# Patient Record
Sex: FEMALE | Race: ASIAN | NOT HISPANIC OR LATINO | URBAN - METROPOLITAN AREA
[De-identification: names, ages, dates, MRNs, and addresses within clinical notes are randomized per-mention and may not be internally consistent; named-entity substitution may affect disease eponyms.]

---

## 2021-01-25 ENCOUNTER — INPATIENT (INPATIENT)
Facility: HOSPITAL | Age: 51
LOS: 1 days | Discharge: HOME | DRG: 310 | End: 2021-01-27
Attending: INTERNAL MEDICINE | Admitting: INTERNAL MEDICINE
Payer: COMMERCIAL

## 2021-01-25 VITALS
SYSTOLIC BLOOD PRESSURE: 159 MMHG | TEMPERATURE: 98 F | HEART RATE: 155 BPM | DIASTOLIC BLOOD PRESSURE: 95 MMHG | OXYGEN SATURATION: 98 % | RESPIRATION RATE: 20 BRPM

## 2021-01-25 DIAGNOSIS — Z29.9 ENCOUNTER FOR PROPHYLACTIC MEASURES, UNSPECIFIED: ICD-10-CM

## 2021-01-25 DIAGNOSIS — I10 ESSENTIAL (PRIMARY) HYPERTENSION: ICD-10-CM

## 2021-01-25 DIAGNOSIS — R00.0 TACHYCARDIA, UNSPECIFIED: ICD-10-CM

## 2021-01-25 LAB
ALBUMIN SERPL ELPH-MCNC: 4.4 G/DL — SIGNIFICANT CHANGE UP (ref 3.5–5)
ALP SERPL-CCNC: 70 U/L — SIGNIFICANT CHANGE UP (ref 40–120)
ALT FLD-CCNC: 38 U/L DA — SIGNIFICANT CHANGE UP (ref 10–60)
ANION GAP SERPL CALC-SCNC: 11 MMOL/L — SIGNIFICANT CHANGE UP (ref 5–17)
APTT BLD: 33.5 SEC — SIGNIFICANT CHANGE UP (ref 27.5–35.5)
AST SERPL-CCNC: 21 U/L — SIGNIFICANT CHANGE UP (ref 10–40)
BASOPHILS # BLD AUTO: 0.06 K/UL — SIGNIFICANT CHANGE UP (ref 0–0.2)
BASOPHILS NFR BLD AUTO: 0.3 % — SIGNIFICANT CHANGE UP (ref 0–2)
BILIRUB SERPL-MCNC: 0.9 MG/DL — SIGNIFICANT CHANGE UP (ref 0.2–1.2)
BUN SERPL-MCNC: 12 MG/DL — SIGNIFICANT CHANGE UP (ref 7–18)
CALCIUM SERPL-MCNC: 9 MG/DL — SIGNIFICANT CHANGE UP (ref 8.4–10.5)
CHLORIDE SERPL-SCNC: 106 MMOL/L — SIGNIFICANT CHANGE UP (ref 96–108)
CO2 SERPL-SCNC: 21 MMOL/L — LOW (ref 22–31)
CREAT SERPL-MCNC: 0.8 MG/DL — SIGNIFICANT CHANGE UP (ref 0.5–1.3)
EOSINOPHIL # BLD AUTO: 0.11 K/UL — SIGNIFICANT CHANGE UP (ref 0–0.5)
EOSINOPHIL NFR BLD AUTO: 0.6 % — SIGNIFICANT CHANGE UP (ref 0–6)
GLUCOSE SERPL-MCNC: 121 MG/DL — HIGH (ref 70–99)
HCG SERPL-ACNC: <1 MIU/ML — SIGNIFICANT CHANGE UP
HCG UR QL: NEGATIVE — SIGNIFICANT CHANGE UP
HCT VFR BLD CALC: 41.6 % — SIGNIFICANT CHANGE UP (ref 34.5–45)
HGB BLD-MCNC: 13.9 G/DL — SIGNIFICANT CHANGE UP (ref 11.5–15.5)
IMM GRANULOCYTES NFR BLD AUTO: 0.5 % — SIGNIFICANT CHANGE UP (ref 0–1.5)
INR BLD: 0.96 RATIO — SIGNIFICANT CHANGE UP (ref 0.88–1.16)
LYMPHOCYTES # BLD AUTO: 1.15 K/UL — SIGNIFICANT CHANGE UP (ref 1–3.3)
LYMPHOCYTES # BLD AUTO: 6.7 % — LOW (ref 13–44)
MAGNESIUM SERPL-MCNC: 1.9 MG/DL — SIGNIFICANT CHANGE UP (ref 1.6–2.6)
MCHC RBC-ENTMCNC: 29 PG — SIGNIFICANT CHANGE UP (ref 27–34)
MCHC RBC-ENTMCNC: 33.4 GM/DL — SIGNIFICANT CHANGE UP (ref 32–36)
MCV RBC AUTO: 86.8 FL — SIGNIFICANT CHANGE UP (ref 80–100)
MONOCYTES # BLD AUTO: 0.6 K/UL — SIGNIFICANT CHANGE UP (ref 0–0.9)
MONOCYTES NFR BLD AUTO: 3.5 % — SIGNIFICANT CHANGE UP (ref 2–14)
NEUTROPHILS # BLD AUTO: 15.2 K/UL — HIGH (ref 1.8–7.4)
NEUTROPHILS NFR BLD AUTO: 88.4 % — HIGH (ref 43–77)
NRBC # BLD: 0 /100 WBCS — SIGNIFICANT CHANGE UP (ref 0–0)
PLATELET # BLD AUTO: 310 K/UL — SIGNIFICANT CHANGE UP (ref 150–400)
POTASSIUM SERPL-MCNC: 3.8 MMOL/L — SIGNIFICANT CHANGE UP (ref 3.5–5.3)
POTASSIUM SERPL-SCNC: 3.8 MMOL/L — SIGNIFICANT CHANGE UP (ref 3.5–5.3)
PROT SERPL-MCNC: 8.6 G/DL — HIGH (ref 6–8.3)
PROTHROM AB SERPL-ACNC: 11.5 SEC — SIGNIFICANT CHANGE UP (ref 10.6–13.6)
RBC # BLD: 4.79 M/UL — SIGNIFICANT CHANGE UP (ref 3.8–5.2)
RBC # FLD: 12.3 % — SIGNIFICANT CHANGE UP (ref 10.3–14.5)
SODIUM SERPL-SCNC: 138 MMOL/L — SIGNIFICANT CHANGE UP (ref 135–145)
TROPONIN I SERPL-MCNC: <0.015 NG/ML — SIGNIFICANT CHANGE UP (ref 0–0.04)
TSH SERPL-MCNC: 0.52 UU/ML — SIGNIFICANT CHANGE UP (ref 0.34–4.82)
WBC # BLD: 17.21 K/UL — HIGH (ref 3.8–10.5)
WBC # FLD AUTO: 17.21 K/UL — HIGH (ref 3.8–10.5)

## 2021-01-25 PROCEDURE — 99285 EMERGENCY DEPT VISIT HI MDM: CPT

## 2021-01-25 PROCEDURE — 71045 X-RAY EXAM CHEST 1 VIEW: CPT | Mod: 26

## 2021-01-25 PROCEDURE — 71275 CT ANGIOGRAPHY CHEST: CPT | Mod: 26

## 2021-01-25 RX ORDER — SODIUM CHLORIDE 9 MG/ML
1000 INJECTION INTRAMUSCULAR; INTRAVENOUS; SUBCUTANEOUS ONCE
Refills: 0 | Status: COMPLETED | OUTPATIENT
Start: 2021-01-25 | End: 2021-01-25

## 2021-01-25 RX ORDER — ASPIRIN/CALCIUM CARB/MAGNESIUM 324 MG
81 TABLET ORAL DAILY
Refills: 0 | Status: DISCONTINUED | OUTPATIENT
Start: 2021-01-25 | End: 2021-01-27

## 2021-01-25 RX ORDER — METOPROLOL TARTRATE 50 MG
12.5 TABLET ORAL
Refills: 0 | Status: DISCONTINUED | OUTPATIENT
Start: 2021-01-25 | End: 2021-01-27

## 2021-01-25 RX ORDER — ATORVASTATIN CALCIUM 80 MG/1
80 TABLET, FILM COATED ORAL AT BEDTIME
Refills: 0 | Status: DISCONTINUED | OUTPATIENT
Start: 2021-01-25 | End: 2021-01-27

## 2021-01-25 RX ADMIN — SODIUM CHLORIDE 1000 MILLILITER(S): 9 INJECTION INTRAMUSCULAR; INTRAVENOUS; SUBCUTANEOUS at 15:48

## 2021-01-25 RX ADMIN — SODIUM CHLORIDE 1000 MILLILITER(S): 9 INJECTION INTRAMUSCULAR; INTRAVENOUS; SUBCUTANEOUS at 19:40

## 2021-01-25 NOTE — ED ADULT NURSE REASSESSMENT NOTE - NS ED NURSE REASSESS COMMENT FT1
Pt resting n bed, admitted to tele service, awaiting floor bed. Pt on cardiac monitor, Sinus Tachycardia. No acute distress noted, denies chest pain, no shortness of breath indicated.

## 2021-01-25 NOTE — ED ADULT NURSE NOTE - ED STAT RN HANDOFF DETAILS
Report given to TALHA Harrell. Pt resting in bed, no acute distress noted, denies chest pain, no shortness of breath indicated.

## 2021-01-25 NOTE — ED ADULT NURSE NOTE - OBJECTIVE STATEMENT
Pt came in with fast heart rate from MD office. Pt states I feel fine. denies chest pain, no shortness of breath indicated. Pt on cardiac monitor, EKG completed.

## 2021-01-25 NOTE — H&P ADULT - ASSESSMENT
50 year old Female with h/o HTN on amlodipine, no changes recently, presents with asymptomatic tachycardia noted by PMD, sent to cardiologist but sent to ED for workup. Admitted for cardiac monitoring.

## 2021-01-25 NOTE — H&P ADULT - HISTORY OF PRESENT ILLNESS
50yoF with h/o HTN on amlodipine alone, no changes recently, presents with asymptomatic tachycardia noted by PMD, sent to cardiologist but sent to ED for w/u, no further testing done at cardiologist. Unknown onset. Pt states she is always anxious since   2 yrs ago. Denies all symptoms including CP, SOB, leg pain or swelling, recent long distance travel, OCP use, FHx of CAD or VTE, vomiting, diarrhea, heavy bleeding, cough, fever. Denies alcohol, coffee or stimulant use. Denies SI/HI. D/w cardiologist Dr. Bright who states in light of HR at 150 he has concern for underlying afib/flutter, attempted carotid massage, wonders if she would benefit from adenosine or diltiazem. 50 year old Female with h/o HTN on amlodipine, no changes recently, presents with asymptomatic tachycardia noted by PMD, sent to cardiologist but sent to ED for workup. Testing done at cardiologist. Unknown onset. Pt states she is always anxious since   2 yrs ago. Denies all symptoms including CHEST Pain, SOB, leg pain or swelling, recent long distance travel, OCP use, FHx of CAD or VTE, vomiting, diarrhea, heavy bleeding, cough, fever. Denies alcohol, coffee or stimulant use. As per ed note D/w cardiologist Dr. Bright who states in light of HR at 150 he has concern for underlying afib/flutter, attempted carotid massage, wonders if she would benefit from adenosine or diltiazem. Ed consulted Dr. Liang who is aware about pt.

## 2021-01-25 NOTE — H&P ADULT - PROBLEM SELECTOR PLAN 1
p/w tachycardia 140s  ekg shows sinus tachy   no chest pain   tele monitor   f/u echo   satrted aspirin , statin ,b-blocker  cardiologist Dr. DIAZ

## 2021-01-25 NOTE — ED PROVIDER NOTE - PHYSICAL EXAMINATION
Afebrile, hemodynamically stable, saturating well  NAD, well appearing  Head NCAT  EOMI grossly, anicteric  MMM  No neck swelling/tenderness/prominence  No JVD  Tachy, reg rhythm, nml S1/S2, no m/r/g  Lungs CTAB, no w/r/r  Abd soft, NT, ND, nml BS, no rebound or guarding  AAO, CN's 3-12 grossly intact  GAMA spontaneously, no leg cyanosis or edema  Skin warm, well perfused, no rashes or hives

## 2021-01-25 NOTE — ED PROVIDER NOTE - CLINICAL SUMMARY MEDICAL DECISION MAKING FREE TEXT BOX
Character low suspicion for PE and no CP/SOB or e/o or risk factors for DVT. No e/o clinical anemia or bleeding. No source of fluid losses. Sinus tachycardia. Signed off care to  who will f/u all labs and w/u and reassess. Character low suspicion for PE and no CP/SOB or e/o or risk factors for DVT. No e/o clinical anemia or bleeding. No source of fluid losses. Sinus tachycardia on ECG. Signed off care to  who will f/u all labs and w/u and reassess.

## 2021-01-25 NOTE — ED PROVIDER NOTE - OBJECTIVE STATEMENT
50yoF with h/o HTN on amlodipine alone, no changes recently, presents with asymptomatic tachycardia noted by PMD, sent to cardiologist but sent to ED for w/u, no further testing done at cardiologist. Unknown onset. Pt states she is always anxious since   2 yrs ago. Denies all symptoms including CP, SOB, leg pain or swelling, recent long distance travel, OCP use, FHx of CAD or VTE, vomiting, diarrhea, heavy bleeding, cough, fever. Denies alcohol, coffee or stimulant use. Denies SI/HI. 50yoF with h/o HTN on amlodipine alone, no changes recently, presents with asymptomatic tachycardia noted by PMD, sent to cardiologist but sent to ED for w/u, no further testing done at cardiologist. Unknown onset. Pt states she is always anxious since   2 yrs ago. Denies all symptoms including CP, SOB, leg pain or swelling, recent long distance travel, OCP use, FHx of CAD or VTE, vomiting, diarrhea, heavy bleeding, cough, fever. Denies alcohol, coffee or stimulant use. Denies SI/HI. D/w cardiologist Dr. Bright who states in light of HR at 150 he has concern for underlying afib/flutter, attempted carotid massage, wonders if she would benefit from adenosine or diltiazem.

## 2021-01-25 NOTE — ED ADULT TRIAGE NOTE - CHIEF COMPLAINT QUOTE
Fast heart rate at cardiologist office.  Denies complaints Fast heart rate at cardiologist office.  Denies complaints.  Ekg requested in main ED

## 2021-01-25 NOTE — ED ADULT NURSE NOTE - ED STAT RN HANDOFF DETAILS 2
pt.remained  stable. denies pain. on Tele box O with sinus tachycardia hr 113 .transfer to holding area.report given to ankit mulligan.not  in distress

## 2021-01-25 NOTE — H&P ADULT - NSHPPHYSICALEXAM_GEN_ALL_CORE
PHYSICAL EXAM:  GENERAL: NAD, speaks in full sentences, no signs of respiratory distress  HEAD:  Atraumatic, Normocephalic  EYES: EOMI, PERRLA, conjunctiva and sclera clear  NECK: Supple, No JVD  CHEST/LUNG: Clear to auscultation bilaterally; No wheeze; No crackles; No accessory muscles used  HEART: Regular rate and rhythm; No murmurs;   ABDOMEN: Soft, Nontender, Nondistended; Bowel sounds present; No guarding  EXTREMITIES:  2+ Peripheral Pulses, No cyanosis or edema  PSYCH: AAOx3  NEUROLOGY: non-focal  SKIN: No rashes or lesions      Vital Signs Last 24 Hrs  T(C): 37.2 (25 Jan 2021 20:07), Max: 37.4 (25 Jan 2021 14:48)  T(F): 99 (25 Jan 2021 20:07), Max: 99.3 (25 Jan 2021 14:48)  HR: 129 (25 Jan 2021 20:07) (129 - 155)  BP: 142/79 (25 Jan 2021 20:07) (142/79 - 159/95)  BP(mean): --  RR: 17 (25 Jan 2021 20:07) (17 - 20)  SpO2: 98% (25 Jan 2021 20:07) (98% - 98%) PHYSICAL EXAM:  GENERAL: NAD, speaks in full sentences, no signs of respiratory distress  HEAD:  Atraumatic, Normocephalic  EYES: EOMI, PERRLA, conjunctiva and sclera clear  NECK: Supple, No JVD  CHEST/LUNG: Clear to auscultation bilaterally; No wheeze; No crackles; No accessory muscles used  HEART: s1,s2 ; No murmurs;   ABDOMEN: Soft, Nontender, Nondistended; Bowel sounds present; No guarding  EXTREMITIES:  2+ Peripheral Pulses, No cyanosis or edema  PSYCH: AAOx3  NEUROLOGY: no-focal defiict  SKIN: No rashes or lesions      Vital Signs Last 24 Hrs  T(C): 37.2 (25 Jan 2021 20:07), Max: 37.4 (25 Jan 2021 14:48)  T(F): 99 (25 Jan 2021 20:07), Max: 99.3 (25 Jan 2021 14:48)  HR: 129 (25 Jan 2021 20:07) (129 - 155)  BP: 142/79 (25 Jan 2021 20:07) (142/79 - 159/95)  BP(mean): --  RR: 17 (25 Jan 2021 20:07) (17 - 20)  SpO2: 98% (25 Jan 2021 20:07) (98% - 98%)

## 2021-01-25 NOTE — H&P ADULT - PROBLEM SELECTOR PLAN 3
RISK                                                          Points  [] Previous VTE                                           3  [] Thrombophilia                                        2  [] Lower limb paralysis                              2   [] Current Cancer                                       2   [x] Immobilization > 24 hrs                        1  [] ICU/CCU stay > 24 hours                       1  [] Age > 60                                                   1  score 1

## 2021-01-26 LAB
A1C WITH ESTIMATED AVERAGE GLUCOSE RESULT: 5.6 % — SIGNIFICANT CHANGE UP (ref 4–5.6)
ALBUMIN SERPL ELPH-MCNC: 4.3 G/DL — SIGNIFICANT CHANGE UP (ref 3.5–5)
ALP SERPL-CCNC: 70 U/L — SIGNIFICANT CHANGE UP (ref 40–120)
ALT FLD-CCNC: 35 U/L DA — SIGNIFICANT CHANGE UP (ref 10–60)
AMMONIA BLD-MCNC: 112 UMOL/L — HIGH (ref 11–32)
ANION GAP SERPL CALC-SCNC: 10 MMOL/L — SIGNIFICANT CHANGE UP (ref 5–17)
APPEARANCE UR: CLEAR — SIGNIFICANT CHANGE UP
AST SERPL-CCNC: 16 U/L — SIGNIFICANT CHANGE UP (ref 10–40)
BACTERIA # UR AUTO: ABNORMAL /HPF
BASOPHILS # BLD AUTO: 0.07 K/UL — SIGNIFICANT CHANGE UP (ref 0–0.2)
BASOPHILS NFR BLD AUTO: 0.5 % — SIGNIFICANT CHANGE UP (ref 0–2)
BILIRUB SERPL-MCNC: 2.4 MG/DL — HIGH (ref 0.2–1.2)
BILIRUB UR-MCNC: NEGATIVE — SIGNIFICANT CHANGE UP
BUN SERPL-MCNC: 7 MG/DL — SIGNIFICANT CHANGE UP (ref 7–18)
CALCIUM SERPL-MCNC: 9.4 MG/DL — SIGNIFICANT CHANGE UP (ref 8.4–10.5)
CHLORIDE SERPL-SCNC: 105 MMOL/L — SIGNIFICANT CHANGE UP (ref 96–108)
CHOLEST SERPL-MCNC: 233 MG/DL — HIGH
CO2 SERPL-SCNC: 23 MMOL/L — SIGNIFICANT CHANGE UP (ref 22–31)
COLOR SPEC: YELLOW — SIGNIFICANT CHANGE UP
CREAT SERPL-MCNC: 0.75 MG/DL — SIGNIFICANT CHANGE UP (ref 0.5–1.3)
DIFF PNL FLD: ABNORMAL
EOSINOPHIL # BLD AUTO: 0.03 K/UL — SIGNIFICANT CHANGE UP (ref 0–0.5)
EOSINOPHIL NFR BLD AUTO: 0.2 % — SIGNIFICANT CHANGE UP (ref 0–6)
EPI CELLS # UR: ABNORMAL /HPF
ERYTHROCYTE [SEDIMENTATION RATE] IN BLOOD: 24 MM/HR — HIGH (ref 0–15)
ESTIMATED AVERAGE GLUCOSE: 114 MG/DL — SIGNIFICANT CHANGE UP (ref 68–114)
ETHANOL SERPL-MCNC: <3 MG/DL — SIGNIFICANT CHANGE UP (ref 0–10)
FERRITIN SERPL-MCNC: 126 NG/ML — SIGNIFICANT CHANGE UP (ref 15–150)
FOLATE SERPL-MCNC: 16.7 NG/ML — SIGNIFICANT CHANGE UP
GLUCOSE SERPL-MCNC: 127 MG/DL — HIGH (ref 70–99)
GLUCOSE UR QL: NEGATIVE — SIGNIFICANT CHANGE UP
HCT VFR BLD CALC: 40.6 % — SIGNIFICANT CHANGE UP (ref 34.5–45)
HDLC SERPL-MCNC: 68 MG/DL — SIGNIFICANT CHANGE UP
HGB BLD-MCNC: 13.4 G/DL — SIGNIFICANT CHANGE UP (ref 11.5–15.5)
IMM GRANULOCYTES NFR BLD AUTO: 0.7 % — SIGNIFICANT CHANGE UP (ref 0–1.5)
IRON SATN MFR SERPL: 148 UG/DL — SIGNIFICANT CHANGE UP (ref 40–150)
IRON SATN MFR SERPL: 34 % — SIGNIFICANT CHANGE UP (ref 15–50)
KETONES UR-MCNC: NEGATIVE — SIGNIFICANT CHANGE UP
LDH SERPL L TO P-CCNC: 184 U/L — SIGNIFICANT CHANGE UP (ref 120–225)
LEUKOCYTE ESTERASE UR-ACNC: NEGATIVE — SIGNIFICANT CHANGE UP
LIPID PNL WITH DIRECT LDL SERPL: 146 MG/DL — HIGH
LYMPHOCYTES # BLD AUTO: 14.5 % — SIGNIFICANT CHANGE UP (ref 13–44)
LYMPHOCYTES # BLD AUTO: 2.01 K/UL — SIGNIFICANT CHANGE UP (ref 1–3.3)
MAGNESIUM SERPL-MCNC: 2.2 MG/DL — SIGNIFICANT CHANGE UP (ref 1.6–2.6)
MCHC RBC-ENTMCNC: 28.9 PG — SIGNIFICANT CHANGE UP (ref 27–34)
MCHC RBC-ENTMCNC: 33 GM/DL — SIGNIFICANT CHANGE UP (ref 32–36)
MCV RBC AUTO: 87.5 FL — SIGNIFICANT CHANGE UP (ref 80–100)
MONOCYTES # BLD AUTO: 0.91 K/UL — HIGH (ref 0–0.9)
MONOCYTES NFR BLD AUTO: 6.6 % — SIGNIFICANT CHANGE UP (ref 2–14)
NEUTROPHILS # BLD AUTO: 10.72 K/UL — HIGH (ref 1.8–7.4)
NEUTROPHILS NFR BLD AUTO: 77.5 % — HIGH (ref 43–77)
NITRITE UR-MCNC: NEGATIVE — SIGNIFICANT CHANGE UP
NON HDL CHOLESTEROL: 165 MG/DL — HIGH
NRBC # BLD: 0 /100 WBCS — SIGNIFICANT CHANGE UP (ref 0–0)
PH UR: 6.5 — SIGNIFICANT CHANGE UP (ref 5–8)
PHOSPHATE SERPL-MCNC: 2.9 MG/DL — SIGNIFICANT CHANGE UP (ref 2.5–4.5)
PLATELET # BLD AUTO: 316 K/UL — SIGNIFICANT CHANGE UP (ref 150–400)
POTASSIUM SERPL-MCNC: 3.6 MMOL/L — SIGNIFICANT CHANGE UP (ref 3.5–5.3)
POTASSIUM SERPL-SCNC: 3.6 MMOL/L — SIGNIFICANT CHANGE UP (ref 3.5–5.3)
PROT SERPL-MCNC: 8.5 G/DL — HIGH (ref 6–8.3)
PROT UR-MCNC: 30 MG/DL
RBC # BLD: 4.64 M/UL — SIGNIFICANT CHANGE UP (ref 3.8–5.2)
RBC # FLD: 12.7 % — SIGNIFICANT CHANGE UP (ref 10.3–14.5)
RBC CASTS # UR COMP ASSIST: ABNORMAL /HPF (ref 0–2)
SARS-COV-2 IGG SERPL QL IA: NEGATIVE — SIGNIFICANT CHANGE UP
SARS-COV-2 IGM SERPL IA-ACNC: 0.07 INDEX — SIGNIFICANT CHANGE UP
SARS-COV-2 RNA SPEC QL NAA+PROBE: SIGNIFICANT CHANGE UP
SARS-COV-2 RNA SPEC QL NAA+PROBE: SIGNIFICANT CHANGE UP
SODIUM SERPL-SCNC: 138 MMOL/L — SIGNIFICANT CHANGE UP (ref 135–145)
SP GR SPEC: 1.01 — SIGNIFICANT CHANGE UP (ref 1.01–1.02)
T4 AB SER-ACNC: 11.6 UG/DL — SIGNIFICANT CHANGE UP (ref 4.6–12)
T4 FREE SERPL-MCNC: 1.4 NG/DL — SIGNIFICANT CHANGE UP (ref 0.9–1.8)
TIBC SERPL-MCNC: 440 UG/DL — SIGNIFICANT CHANGE UP (ref 250–450)
TRIGL SERPL-MCNC: 93 MG/DL — SIGNIFICANT CHANGE UP
TROPONIN I SERPL-MCNC: <0.015 NG/ML — SIGNIFICANT CHANGE UP (ref 0–0.04)
TSH SERPL-MCNC: 0.87 UU/ML — SIGNIFICANT CHANGE UP (ref 0.34–4.82)
UIBC SERPL-MCNC: 292 UG/DL — SIGNIFICANT CHANGE UP (ref 110–370)
UROBILINOGEN FLD QL: NEGATIVE — SIGNIFICANT CHANGE UP
VIT B12 SERPL-MCNC: 652 PG/ML — SIGNIFICANT CHANGE UP (ref 232–1245)
WBC # BLD: 13.83 K/UL — HIGH (ref 3.8–10.5)
WBC # FLD AUTO: 13.83 K/UL — HIGH (ref 3.8–10.5)
WBC UR QL: SIGNIFICANT CHANGE UP /HPF (ref 0–5)

## 2021-01-26 RX ADMIN — Medication 12.5 MILLIGRAM(S): at 06:22

## 2021-01-26 RX ADMIN — ATORVASTATIN CALCIUM 80 MILLIGRAM(S): 80 TABLET, FILM COATED ORAL at 23:04

## 2021-01-26 RX ADMIN — ATORVASTATIN CALCIUM 80 MILLIGRAM(S): 80 TABLET, FILM COATED ORAL at 02:51

## 2021-01-26 RX ADMIN — Medication 81 MILLIGRAM(S): at 11:40

## 2021-01-26 RX ADMIN — Medication 12.5 MILLIGRAM(S): at 17:13

## 2021-01-26 NOTE — CONSULT NOTE ADULT - SUBJECTIVE AND OBJECTIVE BOX
HISTORY OF PRESENT ILLNESS: HPI:  50 year old Female with h/o HTN on amlodipine, no changes recently, presents with  tachycardia noted by PMD, sent to cardiologist but sent to ED for workup. Testing done at cardiologist. Unknown onset. Pt states she is always anxious since   2 yrs ago. Denies all symptoms including CHEST Pain, SOB, leg pain or swelling, recent long distance travel, OCP use, FHx of CAD or VTE, vomiting, diarrhea, heavy bleeding, cough, fever. Denies alcohol, coffee or stimulant use. As per ed note D/w cardiologist Dr. Bright who states in light of HR at 150 he has concern for underlying afib/flutter, attempted carotid massage, wonders if she would benefit from adenosine or diltiazem. She denies CP, SOB or LOC.     PAST MEDICAL & SURGICAL HISTORY:  Hypertension            MEDICATIONS:  MEDICATIONS  (STANDING):  aspirin  chewable 81 milliGRAM(s) Oral daily  atorvastatin 80 milliGRAM(s) Oral at bedtime  metoprolol tartrate 12.5 milliGRAM(s) Oral two times a day      Allergies    No Known Allergies    Intolerances        FAMILY HISTORY:    Non-contributary for premature coronary disease or sudden cardiac death    SOCIAL HISTORY:    [ X] Non-smoker  [ ] Smoker  [ ] Alcohol      REVIEW OF SYSTEMS:  [ ]chest pain  [  ]shortness of breath  [  ]palpitations  [  ]syncope  [ ]near syncope [ ]upper extremity weakness   [ ] lower extremity weakness  [  ]diplopia  [  ]altered mental status   [  ]fevers  [ ]chills [ ]nausea  [ ]vomitting  [  ]dysphagia    [ ]abdominal pain  [ ]melena  [ ]BRBPR    [  ]epistaxis  [  ]rash    [ ]lower extremity edema        [ X] All others negative	  [ ] Unable to obtain      LABS:	 	    CARDIAC MARKERS:  CARDIAC MARKERS ( 2021 07:03 )  <0.015 ng/mL / x     / x     / x     / x      CARDIAC MARKERS ( 2021 15:45 )  <0.015 ng/mL / x     / x     / x     / x                                  13.4   13.83 )-----------( 316      ( 2021 07:03 )             40.6     Hb Trend: 13.4<--, 13.9<--    01-    138  |  105  |  7   ----------------------------<  127<H>  3.6   |  23  |  0.75    Ca    9.4      2021 07:03  Phos  2.9       Mg     2.2         TPro  8.5<H>  /  Alb  4.3  /  TBili  2.4<H>  /  DBili  x   /  AST  16  /  ALT  35  /  AlkPhos  70      Creatinine Trend: 0.75<--, 0.80<--    Coags:  PT/INR - ( 2021 15:45 )   PT: 11.5 sec;   INR: 0.96 ratio         PTT - ( 2021 15:45 )  PTT:33.5 sec    TSH: Thyroid Stimulating Hormone, Serum: 0.87 uU/mL ( @ 07:03)  Thyroid Stimulating Hormone, Serum: 0.52 uU/mL ( @ 15:45)    PHYSICAL EXAM:  T(C): 36.4 (21 @ 07:25), Max: 37.4 (21 @ 14:48)  HR: 103 (21 @ 07:25) (101 - 155)  BP: 133/81 (21 @ 07:25) (125/76 - 159/95)  RR: 18 (21 @ 07:25) (17 - 20)  SpO2: 98% (21 @ 07:25) (97% - 98%)  Wt(kg): --     I&O's Summary      Gen: Appears well in NAD  HEENT:  (-)icterus (-)pallor  CV: N S1 S2 1/6 MG (+)2 Pulses B/l  Resp:  Clear to ausculatation B/L, normal effort  GI: (+) BS Soft, NT, ND  Lymph:  (-)Edema, (-)obvious lymphadenopathy  Skin: Warm to touch, Normal turgor  Psych: Appropriate mood and affect        TELEMETRY: 	  Tsinus tach    ECG:  	sinus tachycardia 146 BPM, Delayed R wave progression    RADIOLOGY:         CXR:   < from: Xray Chest 1 View-PORTABLE IMMEDIATE (Xray Chest 1 View-PORTABLE IMMEDIATE .) (21 @ 15:21) >  Left basilar pulmonary infiltrate versus atelectasis. Follow chest x-rays until resolution are recommended. This finding is communicated with the emergency department via the PACS communication system.    No evidence for pleural effusion or pneumothorax.    The trachea is midline.    The cardiac silhouette is magnified by AP technique.    < end of copied text >      ASSESSMENT/PLAN: 	50y Female  HTN on amlodipine, no changes recently, presents with  tachycardia noted by PMD.    - Long R-P tachycardia ? Sinus tach vs Atrial Tachycardia.  EP Eval Dr. zhu called   - Echo r/o structural heart disease  - TSH wnl  - further rec pending above    Mesfin Liang MD, [facility]  BEEPER [phone]

## 2021-01-26 NOTE — CHART NOTE - NSCHARTNOTEFT_GEN_A_CORE
50 year old Female with h/o HTN on amlodipine, presented with asymptomatic tachycardia, sent by PCP for HR od 150 bpm on EKG   Pt was sent for further cardiology  workup.   Pt denies  coffee, alcohol or other stimulant use.   In ED EKG showed sinus tachycardia 146 BPM, TSH WNL, troponin negative   Admitted to telemetry for further evalaution  Pt seen at bedside denies chest pain, palpitation, dizziness, SOB.   Pt was seen by cardiology and EP, low dose of metoprolol recommended   Pending echo   Pt seen at bedside, states feels fine, no chest pain or palpitation. Sinus tachycardia on telemetry monitoring             OBJECTIVE:  Vital Signs Last 24 Hrs  T(C): 37.2 (26 Jan 2021 17:19), Max: 37.2 (25 Jan 2021 20:07)  T(F): 98.9 (26 Jan 2021 17:19), Max: 99 (25 Jan 2021 20:07)  HR: 122 (26 Jan 2021 17:19) (101 - 129)  BP: 147/83 (26 Jan 2021 17:19) (125/76 - 147/83)  BP(mean): --  RR: 18 (26 Jan 2021 17:19) (17 - 20)  SpO2: 97% (26 Jan 2021 17:19) (97% - 98%)    FOCUSED PHYSICAL EXAM:  Neuro: awake, alert, oriented x 3. No neuro deficit  Cardiovascular: Pulses +2 B/L in lower and upper extremities,  BP stable, No edema.  tachycardia to 120   Respiratory: Respirations regular, unlabored, breath sounds clear B/L.   GI: Abdomen soft, non-tender, positive bowel sounds.  : no bladder distention noted. No complaints at this time.  Skin: Dry, intact, no bruising, no diaphoresis.    I&O's      LABS:                        13.4   13.83 )-----------( 316      ( 26 Jan 2021 07:03 )             40.6   CARDIAC MARKERS ( 26 Jan 2021 07:03 )  <0.015 ng/mL / x     / x     / x     / x      CARDIAC MARKERS ( 25 Jan 2021 15:45 )  <0.015 ng/mL / x     / x     / x     / x        01-26    138  |  105  |  7   ----------------------------<  127<H>  3.6   |  23  |  0.75    Ca    9.4      26 Jan 2021 07:03  Phos  2.9     01-26  Mg     2.2     01-26    TPro  8.5<H>  /  Alb  4.3  /  TBili  2.4<H>  /  DBili  x   /  AST  16  /  ALT  35  /  AlkPhos  70  01-26      ASSESSMENT   50 year old Female with h/o HTN on amlodipine, presented with asymptomatic tachycardia, sent by PCP for HR od 150 bpm on EKG   Pt was sent for further cardiology  workup.   Pt denies  coffee, alcohol or other stimulant use.   In ED EKG showed sinus tachycardia 146 BPM, TSH WNL, troponin negative   Admitted to telemetry for further evalaution    PLAN   Sinus tachycardia  tachycardia   ekg shows sinus tachy   seen by EP and cardiology  recommended holter monitoring on outpatient bases   f/u echo   small dose of b-blocker

## 2021-01-26 NOTE — CONSULT NOTE ADULT - SUBJECTIVE AND OBJECTIVE BOX
EP Attending    HISTORY OF PRESENT ILLNESS: HPI:  50 year old Female with h/o HTN on amlodipine, no changes recently, presents with asymptomatic tachycardia noted by PMD, sent to cardiologist but sent to ED for workup. Testing done at cardiologist. Unknown onset. Pt states she is always anxious since   2 yrs ago. Denies all symptoms including CHEST Pain, SOB, leg pain or swelling, recent long distance travel, OCP use, FHx of CAD or VTE, vomiting, diarrhea, heavy bleeding, cough, fever. Denies alcohol, coffee or stimulant use. As per ed note D/w cardiologist Dr. Bright who states in light of HR at 150 he has concern for underlying afib/flutter, attempted carotid massage, wonders if she would benefit from adenosine or diltiazem. Ed consulted Dr. Liang who is aware about pt.  (2021 20:31)    Seen in ED, HR going ~110-120bpm.  EKG's reviewed and discussed with patient.  Not having any symptoms attributable to rapid heartrate.  SHe was present for a regular checkup and was referred for evaluation after HR in office was 150bpm.  No sweating, fatigue, SOB on exertion, orthopnea, palpitations, leg edema, change in appetite, blurry vision, pleuritic chest discomfort or leg cramping.    States she does not drink smoke or use illicit drugs, and is not on any weight loss or energy-supplements.    A 10 pt ROS is otherwise negative.    PAST MEDICAL & SURGICAL HISTORY:  Hypertension      MEDICATIONS  (STANDING):  aspirin  chewable 81 milliGRAM(s) Oral daily  atorvastatin 80 milliGRAM(s) Oral at bedtime  metoprolol tartrate 12.5 milliGRAM(s) Oral two times a day      Allergies    No Known Allergies    Intolerances    FAMILY HISTORY:    Non-contributary for premature coronary disease or sudden cardiac death    SOCIAL HISTORY:    [ x] Non-smoker  [ ] Smoker  [ ] Alcohol      PHYSICAL EXAM:  T(C): 36.6 (21 @ 12:00), Max: 37.4 (21 @ 14:48)  HR: 114 (21 @ 12:00) (101 - 155)  BP: 134/82 (21 @ 12:00) (125/76 - 159/95)  RR: 20 (21 @ 12:00) (17 - 20)  SpO2: 98% (21 @ 12:00) (97% - 98%)  Wt(kg): --    General: Well nourished, no acute distress, alert and oriented x 3  Head: normocephalic, no trauma  Neck: no JVD, no bruit, supple, not enlarged  CV: rapid and regular S1S2, no S3, no murmurs.    Lungs: clear BL, no rales or wheezes  Abdomen: bowel sounds +, soft, nontender, nondistended  Extremities: no clubbing, cyanosis or edema  Neuro: Moves all 4 extremities, sensation intact x 4 extremities  Skin: warm and moist, normal turgor  Psych: Mood and affect are appropriate for circumstances  MSK: normal range of motion and strength x4 extremities.    TELEMETRY: sinus tachy 110-150bpm	    ECG:  sinus tachycardia at varying rates, 110, 130, 150bpm. No atrial flutter or fibrillation.  Echo: LV EF 55%, normal chamber size.  CTA: No PE.  Grossly normal bi-atrial size and overal cardiac morphology on non-gated CT scan.  	  	  LABS:	 	                          13.4   13.83 )-----------( 316      ( 2021 07:03 )             40.6         138  |  105  |  7   ----------------------------<  127<H>  3.6   |  23  |  0.75    Ca    9.4      2021 07:03  Phos  2.9       Mg     2.2         TPro  8.5<H>  /  Alb  4.3  /  TBili  2.4<H>  /  DBili  x   /  AST  16  /  ALT  35  /  AlkPhos  70    TSH: Thyroid Stimulating Hormone, Serum: 0.87 uU/mL ( @ 07:03)  Thyroid Stimulating Hormone, Serum: 0.52 uU/mL ( @ 15:45)      ASSESSMENT/PLAN: 	50y Female with asymptomatic sinus tachycardia out of proportion to her activity.  No AFib or AFltutter on telemetry or 12-lead EKG.    Thyroid function is within normal limits.  Unlikely to be a pheochromocytoma without associated palpitations, headache, pallor, etc, but can send 24hr urine metanephrines as an outpatient.    Recommend extended holter monitoring for 72hr heartrate trend.  If over 100bpm all day every day, then she likely has inappropriate sinus tachycardia.  Management would be with escalating doses of beta blockers and/or calcium channel blockers (BP permitting), to get HR <100bpm at rest and <120 with activity, only in order to prevent a tachycardia mediated cardiomyopathy.    At this time, no EP indication for prolonged hospitalization, as she has no symptoms.  OK to be discharged on a low dose of beta blockers (metoprolol 25mg BID), with close followup with her Cardiologist.      Clinton Gerard M.D.  Cardiac Electrophysiology    office [phone]  pager [phone]

## 2021-01-27 VITALS
SYSTOLIC BLOOD PRESSURE: 117 MMHG | TEMPERATURE: 99 F | HEART RATE: 103 BPM | OXYGEN SATURATION: 100 % | DIASTOLIC BLOOD PRESSURE: 70 MMHG | RESPIRATION RATE: 18 BRPM

## 2021-01-27 LAB
ANION GAP SERPL CALC-SCNC: 9 MMOL/L — SIGNIFICANT CHANGE UP (ref 5–17)
BUN SERPL-MCNC: 16 MG/DL — SIGNIFICANT CHANGE UP (ref 7–18)
CALCIUM SERPL-MCNC: 9.1 MG/DL — SIGNIFICANT CHANGE UP (ref 8.4–10.5)
CHLORIDE SERPL-SCNC: 105 MMOL/L — SIGNIFICANT CHANGE UP (ref 96–108)
CO2 SERPL-SCNC: 25 MMOL/L — SIGNIFICANT CHANGE UP (ref 22–31)
CREAT SERPL-MCNC: 0.73 MG/DL — SIGNIFICANT CHANGE UP (ref 0.5–1.3)
GLUCOSE SERPL-MCNC: 99 MG/DL — SIGNIFICANT CHANGE UP (ref 70–99)
HCT VFR BLD CALC: 40.2 % — SIGNIFICANT CHANGE UP (ref 34.5–45)
HGB BLD-MCNC: 13.2 G/DL — SIGNIFICANT CHANGE UP (ref 11.5–15.5)
MCHC RBC-ENTMCNC: 28.9 PG — SIGNIFICANT CHANGE UP (ref 27–34)
MCHC RBC-ENTMCNC: 32.8 GM/DL — SIGNIFICANT CHANGE UP (ref 32–36)
MCV RBC AUTO: 88.2 FL — SIGNIFICANT CHANGE UP (ref 80–100)
NRBC # BLD: 0 /100 WBCS — SIGNIFICANT CHANGE UP (ref 0–0)
PLATELET # BLD AUTO: 314 K/UL — SIGNIFICANT CHANGE UP (ref 150–400)
POTASSIUM SERPL-MCNC: 3.6 MMOL/L — SIGNIFICANT CHANGE UP (ref 3.5–5.3)
POTASSIUM SERPL-SCNC: 3.6 MMOL/L — SIGNIFICANT CHANGE UP (ref 3.5–5.3)
RBC # BLD: 4.56 M/UL — SIGNIFICANT CHANGE UP (ref 3.8–5.2)
RBC # FLD: 12.6 % — SIGNIFICANT CHANGE UP (ref 10.3–14.5)
SODIUM SERPL-SCNC: 139 MMOL/L — SIGNIFICANT CHANGE UP (ref 135–145)
WBC # BLD: 11.59 K/UL — HIGH (ref 3.8–10.5)
WBC # FLD AUTO: 11.59 K/UL — HIGH (ref 3.8–10.5)

## 2021-01-27 PROCEDURE — 81001 URINALYSIS AUTO W/SCOPE: CPT

## 2021-01-27 PROCEDURE — 83540 ASSAY OF IRON: CPT

## 2021-01-27 PROCEDURE — 83735 ASSAY OF MAGNESIUM: CPT

## 2021-01-27 PROCEDURE — 85379 FIBRIN DEGRADATION QUANT: CPT

## 2021-01-27 PROCEDURE — 84443 ASSAY THYROID STIM HORMONE: CPT

## 2021-01-27 PROCEDURE — 81025 URINE PREGNANCY TEST: CPT

## 2021-01-27 PROCEDURE — 84100 ASSAY OF PHOSPHORUS: CPT

## 2021-01-27 PROCEDURE — 80061 LIPID PANEL: CPT

## 2021-01-27 PROCEDURE — U0003: CPT

## 2021-01-27 PROCEDURE — 84702 CHORIONIC GONADOTROPIN TEST: CPT

## 2021-01-27 PROCEDURE — 86769 SARS-COV-2 COVID-19 ANTIBODY: CPT

## 2021-01-27 PROCEDURE — 93320 DOPPLER ECHO COMPLETE: CPT

## 2021-01-27 PROCEDURE — 85610 PROTHROMBIN TIME: CPT

## 2021-01-27 PROCEDURE — 71275 CT ANGIOGRAPHY CHEST: CPT

## 2021-01-27 PROCEDURE — 85027 COMPLETE CBC AUTOMATED: CPT

## 2021-01-27 PROCEDURE — 80307 DRUG TEST PRSMV CHEM ANLYZR: CPT

## 2021-01-27 PROCEDURE — 93325 DOPPLER ECHO COLOR FLOW MAPG: CPT

## 2021-01-27 PROCEDURE — 87040 BLOOD CULTURE FOR BACTERIA: CPT

## 2021-01-27 PROCEDURE — 36415 COLL VENOUS BLD VENIPUNCTURE: CPT

## 2021-01-27 PROCEDURE — U0005: CPT

## 2021-01-27 PROCEDURE — 93306 TTE W/DOPPLER COMPLETE: CPT

## 2021-01-27 PROCEDURE — 85025 COMPLETE CBC W/AUTO DIFF WBC: CPT

## 2021-01-27 PROCEDURE — 84436 ASSAY OF TOTAL THYROXINE: CPT

## 2021-01-27 PROCEDURE — 93351 STRESS TTE COMPLETE: CPT

## 2021-01-27 PROCEDURE — 82607 VITAMIN B-12: CPT

## 2021-01-27 PROCEDURE — 93005 ELECTROCARDIOGRAM TRACING: CPT

## 2021-01-27 PROCEDURE — 82728 ASSAY OF FERRITIN: CPT

## 2021-01-27 PROCEDURE — 84439 ASSAY OF FREE THYROXINE: CPT

## 2021-01-27 PROCEDURE — 83615 LACTATE (LD) (LDH) ENZYME: CPT

## 2021-01-27 PROCEDURE — 84484 ASSAY OF TROPONIN QUANT: CPT

## 2021-01-27 PROCEDURE — 82140 ASSAY OF AMMONIA: CPT

## 2021-01-27 PROCEDURE — 85730 THROMBOPLASTIN TIME PARTIAL: CPT

## 2021-01-27 PROCEDURE — 80053 COMPREHEN METABOLIC PANEL: CPT

## 2021-01-27 PROCEDURE — 85652 RBC SED RATE AUTOMATED: CPT

## 2021-01-27 PROCEDURE — 82746 ASSAY OF FOLIC ACID SERUM: CPT

## 2021-01-27 PROCEDURE — 71045 X-RAY EXAM CHEST 1 VIEW: CPT

## 2021-01-27 PROCEDURE — 87635 SARS-COV-2 COVID-19 AMP PRB: CPT

## 2021-01-27 PROCEDURE — 99285 EMERGENCY DEPT VISIT HI MDM: CPT | Mod: 25

## 2021-01-27 PROCEDURE — 83036 HEMOGLOBIN GLYCOSYLATED A1C: CPT

## 2021-01-27 PROCEDURE — 83550 IRON BINDING TEST: CPT

## 2021-01-27 PROCEDURE — 80048 BASIC METABOLIC PNL TOTAL CA: CPT

## 2021-01-27 RX ORDER — ASPIRIN/CALCIUM CARB/MAGNESIUM 324 MG
1 TABLET ORAL
Qty: 15 | Refills: 0
Start: 2021-01-27 | End: 2021-02-10

## 2021-01-27 RX ORDER — AMLODIPINE BESYLATE 2.5 MG/1
0 TABLET ORAL
Qty: 0 | Refills: 0 | DISCHARGE

## 2021-01-27 RX ORDER — ATORVASTATIN CALCIUM 80 MG/1
1 TABLET, FILM COATED ORAL
Qty: 15 | Refills: 0
Start: 2021-01-27 | End: 2021-02-10

## 2021-01-27 RX ORDER — METOPROLOL TARTRATE 50 MG
1 TABLET ORAL
Qty: 15 | Refills: 0
Start: 2021-01-27 | End: 2021-02-10

## 2021-01-27 RX ORDER — METOPROLOL TARTRATE 50 MG
25 TABLET ORAL DAILY
Refills: 0 | Status: DISCONTINUED | OUTPATIENT
Start: 2021-01-27 | End: 2021-01-27

## 2021-01-27 RX ADMIN — Medication 12.5 MILLIGRAM(S): at 05:56

## 2021-01-27 RX ADMIN — Medication 81 MILLIGRAM(S): at 10:36

## 2021-01-27 NOTE — DISCHARGE NOTE PROVIDER - NSDCCPCAREPLAN_GEN_ALL_CORE_FT
PRINCIPAL DISCHARGE DIAGNOSIS  Diagnosis: Sinus tachycardia  Assessment and Plan of Treatment: You presented with palpitation. EKG showed sinus tachycardia. You denied any chest pain and even your troponine ( heart enzyme ) is normal. You were admitted to [facility]. No significant events was detected. You were started on metoprolol which lower your heart rate from 140-100. Cardiology was consulted. Echo didn't show significant abnormalities. Stress test is negative for any ischemia. Please take your meds as prescribed and follow up with Dr. Lau.      SECONDARY DISCHARGE DIAGNOSES  Diagnosis: Hypertension  Assessment and Plan of Treatment: You have history of hypertension. Please take your meds as prescribed and follow up with Dr. Lau.

## 2021-01-27 NOTE — DISCHARGE NOTE NURSING/CASE MANAGEMENT/SOCIAL WORK - PATIENT PORTAL LINK FT
You can access the FollowMyHealth Patient Portal offered by [facility] by registering at the following website: [email or web address]. By joining [facility]’s FollowMyHealth portal, you will also be able to view your health information using other applications (apps) compatible with our system.

## 2021-01-27 NOTE — DISCHARGE NOTE PROVIDER - NSDCMRMEDTOKEN_GEN_ALL_CORE_FT
AMLODIPINE BESYLATE 10MG TABLETS: TK 1 T PO  QD  aspirin 81 mg oral tablet, chewable: 1 tab(s) orally once a day  atorvastatin 80 mg oral tablet: 1 tab(s) orally once a day (at bedtime)   AMLODIPINE BESYLATE 10MG TABLETS: TK 1 T PO  QD  aspirin 81 mg oral tablet, chewable: 1 tab(s) orally once a day  atorvastatin 80 mg oral tablet: 1 tab(s) orally once a day (at bedtime)  metoprolol succinate 25 mg oral tablet, extended release: 1 tab(s) orally once a day   aspirin 81 mg oral tablet, chewable: 1 tab(s) orally once a day  atorvastatin 80 mg oral tablet: 1 tab(s) orally once a day (at bedtime)  metoprolol succinate 25 mg oral tablet, extended release: 1 tab(s) orally once a day

## 2021-01-27 NOTE — DISCHARGE NOTE PROVIDER - CARE PROVIDER_API CALL
Errol Lau  CARDIOVASCULAR DISEASE  [address] [place] [address]  Phone: [phone]  Fax: [phone]  Follow Up Time:

## 2021-01-27 NOTE — PROGRESS NOTE ADULT - SUBJECTIVE AND OBJECTIVE BOX
Patient denies chest pain or shortness of breath.   Review of systems otherwise (-)  	  MEDICATIONS:  MEDICATIONS  (STANDING):  aspirin  chewable 81 milliGRAM(s) Oral daily  atorvastatin 80 milliGRAM(s) Oral at bedtime  metoprolol tartrate 12.5 milliGRAM(s) Oral two times a day      LABS:	 	    CARDIAC MARKERS:  CARDIAC MARKERS ( 26 Jan 2021 07:03 )  <0.015 ng/mL / x     / x     / x     / x      CARDIAC MARKERS ( 25 Jan 2021 15:45 )  <0.015 ng/mL / x     / x     / x     / x                                    13.2   11.59 )-----------( 314      ( 27 Jan 2021 07:05 )             40.2     Hemoglobin: 13.2 g/dL (01-27 @ 07:05)  Hemoglobin: 13.4 g/dL (01-26 @ 07:03)  Hemoglobin: 13.9 g/dL (01-25 @ 15:45)      01-27    139  |  105  |  16  ----------------------------<  99  3.6   |  25  |  0.73    Ca    9.1      27 Jan 2021 07:05  Phos  2.9     01-26  Mg     2.2     01-26    TPro  8.5<H>  /  Alb  4.3  /  TBili  2.4<H>  /  DBili  x   /  AST  16  /  ALT  35  /  AlkPhos  70  01-26    Creatinine Trend: 0.73<--, 0.75<--, 0.80<--      PHYSICAL EXAM:  T(C): 36.6 (01-27-21 @ 04:53), Max: 37.3 (01-26-21 @ 19:26)  HR: 105 (01-27-21 @ 10:37) (90 - 122)  BP: 133/90 (01-27-21 @ 10:37) (126/82 - 147/83)  RR: 18 (01-27-21 @ 10:37) (18 - 18)  SpO2: 100% (01-27-21 @ 10:37) (97% - 100%)  Wt(kg): --  I&O's Summary        Gen: Appears well in NAD  HEENT:  (-)icterus (-)pallor  CV: N S1 S2 1/6 MG (+)2 Pulses B/l  Resp:  Clear to ausculatation B/L, normal effort  GI: (+) BS Soft, NT, ND  Lymph:  (-)Edema, (-)obvious lymphadenopathy  Skin: Warm to touch, Normal turgor  Psych: Appropriate mood and affect      TELEMETRY: 	  sinus, sinus tachycardia        ASSESSMENT/PLAN: 	50y  Female HTN on amlodipine, no changes recently, presents with  tachycardia noted by PMD.    - No convincing evidence of SVT  - treadmill / echo stress today revealed sinus tachycardia at baseline with a ramp up in heart rate with exertion, no sudden onset of SVT.  - ? reflex tachycardia with using Norvasc  - Patient reports she felt well on metoprolol, although we typically would not rate control sinus tachycardia, perhaps we can use this class of meds for treatment of her hypertension.  - D/C norvasc  - start Toprol XL 25 QD  - Oupt cardiac f/u withyDr. Fitzig  - No need for further inpatient cardiac work up.      Mesfin Liang MD, [facility]  BEEPER [phone]       
EP Attending    HISTORY OF PRESENT ILLNESS: HPI:  50 year old Female with h/o HTN on amlodipine, no changes recently, presents with asymptomatic tachycardia noted by PMD, sent to cardiologist but sent to ED for workup. Testing done at cardiologist. Unknown onset. Pt states she is always anxious since   2 yrs ago. Denies all symptoms including CHEST Pain, SOB, leg pain or swelling, recent long distance travel, OCP use, FHx of CAD or VTE, vomiting, diarrhea, heavy bleeding, cough, fever. Denies alcohol, coffee or stimulant use. As per ed note D/w cardiologist Dr. Bright who states in light of HR at 150 he has concern for underlying afib/flutter, attempted carotid massage, wonders if she would benefit from adenosine or diltiazem. Ed consulted Dr. Liang who is aware about pt.  (2021 20:31)    Seen in ED, HR going ~110-120bpm.  EKG's reviewed and discussed with patient.  Not having any symptoms attributable to rapid heartrate.  SHe was present for a regular checkup and was referred for evaluation after HR in office was 150bpm.  No sweating, fatigue, SOB on exertion, orthopnea, palpitations, leg edema, change in appetite, blurry vision, pleuritic chest discomfort or leg cramping.    States she does not drink smoke or use illicit drugs, and is not on any weight loss or energy-supplements.    A 10 pt ROS is otherwise negative.    - feeling well today.  after a low dose of BB, HR <100bpm all night (70s), no new complaints.    aspirin  chewable 81 milliGRAM(s) Oral daily  atorvastatin 80 milliGRAM(s) Oral at bedtime  metoprolol succinate ER 25 milliGRAM(s) Oral daily                        13.2   11.59 )-----------( 314      ( 2021 07:05 )             40.2         139  |  105  |  16  ----------------------------<  99  3.6   |  25  |  0.73    Ca    9.1      2021 07:05  Phos  2.9     -26  Mg     2.2     -    TPro  8.5<H>  /  Alb  4.3  /  TBili  2.4<H>  /  DBili  x   /  AST  16  /  ALT  35  /  AlkPhos  70          T(C): 36.6 (21 @ 04:53), Max: 37.3 (21 @ 19:26)  HR: 105 (21 @ 10:37) (90 - 122)  BP: 133/90 (21 @ 10:37) (126/82 - 147/83)  RR: 18 (21 @ 10:37) (18 - 18)  SpO2: 100% (21 @ 10:37) (97% - 100%)  Wt(kg): --    I&O's Summary      General: Well nourished, no acute distress, alert and oriented x 3  Head: normocephalic, no trauma  Neck: no JVD, no bruit, supple, not enlarged  CV: rapid and regular S1S2, no S3, no murmurs.    Lungs: clear BL, no rales or wheezes  Abdomen: bowel sounds +, soft, nontender, nondistended  Extremities: no clubbing, cyanosis or edema  Neuro: Moves all 4 extremities, sensation intact x 4 extremities  Skin: warm and moist, normal turgor  Psych: Mood and affect are appropriate for circumstances  MSK: normal range of motion and strength x4 extremities.    TELEMETRY: sinus tachy 110-150bpm	    ECG:  sinus tachycardia at varying rates, 110, 130, 150bpm. No atrial flutter or fibrillation.  Echo: LV EF 55%, normal chamber size.  CTA: No PE.  Grossly normal bi-atrial size and overal cardiac morphology on non-gated CT scan.      ASSESSMENT/PLAN: 	50y Female with asymptomatic sinus tachycardia out of proportion to her activity.    No AFib or AFltutter on telemetry or 12-lead EKG.    Thyroid function is within normal limits.  Unlikely to be a pheochromocytoma without associated palpitations, headache, pallor, etc, but can send 24hr urine metanephrines as an outpatient.    Recommend extended holter monitoring for 72hr heartrate trend.  If over 100bpm all day every day, then she likely has inappropriate sinus tachycardia.  Management would be with escalating doses of beta blockers and/or calcium channel blockers (BP permitting), to get HR <100bpm at rest and <120 with activity, only in order to prevent a tachycardia mediated cardiomyopathy.    At this time, no EP indication for prolonged hospitalization, as she has no symptoms.  OK to be discharged on a low dose of beta blockers (recommend metoprolol TARTRATE 12.5-25mg BID due to the high cost of ToprolXL), with close followup with her Cardiologist.      Clinton Gerard M.D.  Cardiac Electrophysiology    office [phone]  pager [phone]

## 2021-01-30 LAB
CULTURE RESULTS: SIGNIFICANT CHANGE UP
CULTURE RESULTS: SIGNIFICANT CHANGE UP
SPECIMEN SOURCE: SIGNIFICANT CHANGE UP
SPECIMEN SOURCE: SIGNIFICANT CHANGE UP

## 2022-04-07 NOTE — PATIENT PROFILE ADULT - FLU SEASON?
Confirmed appt with pt, mailed appt letter.   
Subjective:  58-year-old with a history of urge incontinence.  She is getting Toviaz 8 mg daily.  She recently had a UTI.  She usually gets roughly 1 UTI per year.  She has less control during UTI but in general only wears 1 liner per day.  She is pleased with things currently on Toviaz    Reviewed Pertinent: Vitals and labs.     Blood pressure 132/70, height 5' 9\" (1.753 m), weight (!) 143.9 kg.  UA:    Office Visit on 04/14/2021   Component Date Value   • POCT Color 04/14/2021 Soniya    • POCT Appearance 04/14/2021 Clear    • POCT Glucose Urine 04/14/2021 Negative    • POCT Bilirubin 04/14/2021 Small    • POCT Ketones 04/14/2021 Negative    • POCT Specific Gravity 04/14/2021 >= 1.030    • POCT Occult Blood 04/14/2021 Negative    • POCT pH 04/14/2021 5.5    • POCT Protein 04/14/2021 100 mg/dL    • POCT Urobilinogen 04/14/2021 0.2    • Urine Nitrite 04/14/2021 Negative    • WBC (Leukocyte) Esterase* 04/14/2021 Negative      PVR:  No results found for this or any previous visit.    No notes on file    Physical Exam:   Abdomen soft nontender nondistended  No rebound or CVA tenderness  No suprapubic fullness or distention      Assessment/Plan:  Overactive bladder  - POCT URINE DIP NON-AUTO      58-year-old with history of overactive bladder.  She is doing well on Toviaz 8 mg.  This is refilled.  She will follow up on a yearly basis    
Yes...

## 2023-08-03 NOTE — ED PROVIDER NOTE - EMPLOYMENT
Unemployed Mercedes Flap Text: The defect edges were debeveled with a #15 scalpel blade.  Given the location of the defect, shape of the defect and the proximity to free margins a Mercedes flap was deemed most appropriate.  Using a sterile surgical marker, an appropriate advancement flap was drawn incorporating the defect and placing the expected incisions within the relaxed skin tension lines where possible. The area thus outlined was incised deep to adipose tissue with a #15 scalpel blade.  The skin margins were undermined to an appropriate distance in all directions utilizing iris scissors.